# Patient Record
Sex: FEMALE | URBAN - METROPOLITAN AREA
[De-identification: names, ages, dates, MRNs, and addresses within clinical notes are randomized per-mention and may not be internally consistent; named-entity substitution may affect disease eponyms.]

---

## 2019-03-18 ENCOUNTER — NURSE TRIAGE (OUTPATIENT)
Dept: NURSING | Facility: CLINIC | Age: 19
End: 2019-03-18

## 2019-03-18 NOTE — TELEPHONE ENCOUNTER
"Patient asking if she should see PCP or specialist.  States woke at 4:30AM with entire pelvic region discomfort.  Burning with and without urination.  Doesn't feel like has a fever.  Rates discomfort 4 out of 10.  Advised to be seen within 4 hours.  Will contact clinic when opens/use Patient Portal.    Reason for Disposition    [1] MILD-MODERATE pain AND [2] constant AND [3] present > 2 hours    Additional Information    Negative: Shock suspected (e.g., cold/pale/clammy skin, too weak to stand, low BP, rapid pulse)    Negative: Difficult to awaken or acting confused  (e.g., disoriented, slurred speech)    Negative: Passed out (i.e., lost consciousness, collapsed and was not responding)    Negative: Sounds like a life-threatening emergency to the triager    Negative: Chest pain    Negative: Pain is mainly in upper abdomen  (if needed ask: \"is it mainly above the belly button?\")    Negative: Followed an abdomen (stomach) injury    Negative: [1] Abdominal pain AND [2] pregnant < 20 weeks    Negative: [1] Abdominal pain AND [2] pregnant > 20 weeks    Negative: [1] Abdominal pain AND [2] postpartum < 1 month since delivery    Negative: [1] SEVERE pain (e.g., excruciating) AND [2] present > 1 hour    Negative: [1] SEVERE pain AND [2] age > 60    Negative: [1] Vomiting AND [2] contains red blood or black (\"coffee ground\") material  (Exception: few red streaks in vomit that only happened once)    Negative: Blood in bowel movements   (Exception: blood on surface of BM with constipation)    Negative: Black or tarry bowel movements  (Exception: chronic-unchanged  black-grey bowel movements AND is taking iron pills or Pepto-bismol)    Negative: Patient sounds very sick or weak to the triager    Protocols used: ABDOMINAL PAIN - FEMALE-ADULT-AH      "

## 2019-06-29 ENCOUNTER — NURSE TRIAGE (OUTPATIENT)
Dept: NURSING | Facility: CLINIC | Age: 19
End: 2019-06-29

## 2019-06-29 NOTE — TELEPHONE ENCOUNTER
Rica is calling and states that Rica is not feeling well which started last night.  Pelvic area is burning.  Denies fever.  Rica is urinating more frequently.      Reason for Disposition    Urinating more frequently than usual (i.e., frequency)    Additional Information    Negative: Shock suspected (e.g., cold/pale/clammy skin, too weak to stand, low BP, rapid pulse)    Negative: Sounds like a life-threatening emergency to the triager    Negative: [1] Unable to urinate (or only a few drops) > 4 hours AND     [2] bladder feels very full (e.g., palpable bladder or strong urge to urinate)    Negative: [1] Decreased urination and [2] drinking very little AND [2] dehydration suspected (e.g., dark urine, no urine > 12 hours, very dry mouth, very lightheaded)    Negative: Patient sounds very sick or weak to the triager    Negative: Fever > 100.5 F (38.1 C)    Negative: Side (flank) or lower back pain present    Negative: [1] Can't control passage of urine (i.e., urinary incontinence) AND [2] new onset (< 2 weeks) or worsening    Protocols used: URINARY SYMPTOMS-A-AH

## 2019-06-30 ENCOUNTER — NURSE TRIAGE (OUTPATIENT)
Dept: NURSING | Facility: CLINIC | Age: 19
End: 2019-06-30

## 2019-06-30 NOTE — TELEPHONE ENCOUNTER
"Patient calling. She was seen in [facility] at [facility] last night and diagnosed with \"possible\" UTI. U/A was not strongly suggestive of UTI but was given macrobid if symptoms persisted/worsened. Last night pain with urination increased so she started them. She had temp 100 today and took Tylenol. Now temp 99. Urinary symptoms have improved. No back pain. Worse symptom is nausea. Reviewed home care and symptoms to be seen for.    Additional Information    Negative: Shock suspected (e.g., cold/pale/clammy skin, too weak to stand, low BP, rapid pulse)    Negative: Sounds like a life-threatening emergency to the triager    Negative: Urinary tract infection suspected, but not taking antibiotics    Negative: [1] Unable to urinate (or only a few drops) > 4 hours AND     [2] bladder feels very full (e.g., palpable bladder or strong urge to urinate)    Negative: Passing pure blood or large blood clots (i.e., size > a dime)  (Exceptions: flecks, small strands, or pinkish-red color)    Negative: Patient sounds very sick or weak to the triager    Negative: [1] SEVERE pain (e.g., excruciating) AND [2] no improvement 2 hours after pain medications    Negative: [1] Fever > 100.0 F (37.8 C) AND [2] new onset since starting antibiotics    Negative: [1] Side (flank) or lower back pain AND [2] new onset since starting antibiotics    Negative: [1] Taking antibiotic > 24 hours for UTI AND [2] flank or lower back pain worsening    Negative: [1] Vomiting 2 or more times AND [2] interferes with taking oral antibiotic    Negative: [1] Taking antibiotic > 24 hours for UTI (urinary tract or bladder infection) AND [2] fever persists    Negative: [1] Taking antibiotic > 72 hours (3 days) for UTI AND [2] painful urination or frequency not improved    Negative: [1] Taking antibiotic > 72 hours (3 days) for UTI AND [2] flank or lower back pain not improved    Negative: Diabetes mellitus or weak immune system (e.g., HIV positive, cancer " chemotherapy, transplant patient)    Negative: Sickle cell disease    Negative: [1] Taking antibiotic < 24 hours for UTI AND [2] fever persists    Negative: [1] Taking antibiotic < 72 hours (3 days) for UTI AND [2] painful urination or frequency not improved    [1] Taking antibiotic < 72 hours (3 days) for UTI AND [2] flank or lower back pain not improved    Protocols used: URINARY TRACT INFECTION ON ANTIBIOTIC FOLLOW-UP CALL - FEMALE-A-

## 2020-07-06 ENCOUNTER — NURSE TRIAGE (OUTPATIENT)
Dept: NURSING | Facility: CLINIC | Age: 20
End: 2020-07-06

## 2020-07-06 NOTE — TELEPHONE ENCOUNTER
Calling about COVID testing- recently moved and one of the movers she was around tested positive - she declines symptoms herself. She reports that she was in close proximity to this person and was speaking to him without a mask on.   She was specifically calling for an appointment with [facility]- advised that she called a little bit before the office was open but that they are open now. She will call them to see if they are testing students. Advised that they should be open now and to call back to the office- she verbalizes understanding.     Yuko Zepeda RN BSN 7/6/2020 8:06 AM    Reason for Disposition    [1] COVID-19 EXPOSURE (Close Contact) AND [2] within last 14 days BUT [3] NO symptoms    Additional Information    Negative: COVID-19 has been diagnosed by a healthcare provider (HCP)    Negative: COVID-19 lab test positive    Negative: [1] Symptoms of COVID-19 (e.g., cough, fever, SOB, or others) AND [2] lives in an area with community spread    Negative: [1] Symptoms of COVID-19 (e.g., cough, fever, SOB, or others) AND [2] within 14 days of EXPOSURE (close contact) with diagnosed or suspected COVID-19 patient    Negative: [1] Symptoms of COVID-19 (e.g., cough, fever, SOB, or others) AND [2] within 14 days of travel from high-risk area for COVID-19 community spread (identified by CDC)    Negative: [1] Difficulty breathing (shortness of breath) occurs AND [2] onset > 14 days after COVID-19 EXPOSURE (Close Contact) AND [3] no community spread where patient lives    Negative: [1] Dry cough occurs AND [2] onset > 14 days after COVID-19 EXPOSURE AND [3] no community spread where patient lives    Negative: [1] Wet cough (i.e., white-yellow, yellow, green, or julianne colored sputum) AND [2] onset > 14 days after COVID-19 EXPOSURE AND [3] no community spread where patient lives    Negative: [1] Common cold symptoms AND [2] onset > 14 days after COVID-19 EXPOSURE AND [3] no community spread where patient lives     Negative: [1] COVID-19 EXPOSURE (Close Contact) within last 14 days AND [2] needs COVID-19 lab test to return to work AND [3] NO symptoms    Negative: [1] COVID-19 EXPOSURE (Close Contact) within last 14 days AND [2] exposed person is a healthcare worker who was NOT using all recommended personal protective equipment (i.e., a respirator-N95 mask, eye protection, gloves, and gown) AND [3] NO symptoms    Protocols used: [facility]